# Patient Record
Sex: MALE | Race: WHITE | Employment: FULL TIME | URBAN - METROPOLITAN AREA
[De-identification: names, ages, dates, MRNs, and addresses within clinical notes are randomized per-mention and may not be internally consistent; named-entity substitution may affect disease eponyms.]

---

## 2022-12-21 ENCOUNTER — HOSPITAL ENCOUNTER (EMERGENCY)
Age: 32
Discharge: HOME | End: 2022-12-21
Payer: COMMERCIAL

## 2022-12-21 ENCOUNTER — APPOINTMENT (OUTPATIENT)
Dept: GENERAL RADIOLOGY | Age: 32
End: 2022-12-21
Payer: COMMERCIAL

## 2022-12-21 VITALS
DIASTOLIC BLOOD PRESSURE: 95 MMHG | RESPIRATION RATE: 16 BRPM | HEIGHT: 68 IN | BODY MASS INDEX: 21.98 KG/M2 | HEART RATE: 75 BPM | SYSTOLIC BLOOD PRESSURE: 142 MMHG | OXYGEN SATURATION: 100 % | TEMPERATURE: 97.8 F | WEIGHT: 145 LBS

## 2022-12-21 DIAGNOSIS — S61.313A LACERATION OF LEFT MIDDLE FINGER WITHOUT FOREIGN BODY WITH DAMAGE TO NAIL, INITIAL ENCOUNTER: Primary | ICD-10-CM

## 2022-12-21 PROCEDURE — 2500000003 HC RX 250 WO HCPCS: Performed by: NURSE PRACTITIONER

## 2022-12-21 PROCEDURE — 6370000000 HC RX 637 (ALT 250 FOR IP): Performed by: NURSE PRACTITIONER

## 2022-12-21 PROCEDURE — 73140 X-RAY EXAM OF FINGER(S): CPT

## 2022-12-21 PROCEDURE — 99283 EMERGENCY DEPT VISIT LOW MDM: CPT

## 2022-12-21 PROCEDURE — 12001 RPR S/N/AX/GEN/TRNK 2.5CM/<: CPT

## 2022-12-21 RX ORDER — GINSENG 100 MG
CAPSULE ORAL ONCE
Status: COMPLETED | OUTPATIENT
Start: 2022-12-21 | End: 2022-12-21

## 2022-12-21 RX ORDER — LIDOCAINE HYDROCHLORIDE 20 MG/ML
5 INJECTION, SOLUTION INFILTRATION; PERINEURAL ONCE
Status: COMPLETED | OUTPATIENT
Start: 2022-12-21 | End: 2022-12-21

## 2022-12-21 RX ADMIN — LIDOCAINE HYDROCHLORIDE 5 ML: 20 INJECTION, SOLUTION INFILTRATION; PERINEURAL at 17:19

## 2022-12-21 RX ADMIN — BACITRACIN: 500 OINTMENT TOPICAL at 18:01

## 2022-12-21 NOTE — ED PROVIDER NOTES
EMERGENCY DEPARTMENT ENCOUNTER        PCP: No primary care provider on file. CHIEF COMPLAINT    Chief Complaint   Patient presents with    Finger Injury     Left middle         This patient was not evaluated by the attending physician. I have independently evaluated this patient . HPI    Kirby Oviedo is a 34 y.o. male who presents urgent care with complaints of a left long finger laceration. The patient states he is a  and was working on a truck when he lacerated his finger on a piece of the engine. States he had a tetanus immunization 4 years ago. He was evaluated at urgent care who sent him here for further evaluation. He is complaining of minimal pain as he was anesthetized at 1pm while there. He does have full range of motion. Nothing alleviates or exacerbates his symptoms. He denies any other injuries at this time. He is right-hand dominant    Decreased sensation distal to area of concern, however was anesthetized at urgent care prior to arrival.    Up to date Tetanus Vaccination Status        REVIEW OF SYSTEMS    General:   Denies symptoms preceding injury. Skin: + Laceration. SEE HPI  Musculoskeletal:  No distal numbness, tingling. No obvious tendon or motor deficits. Denies any other musculoskeletal injuries or skin trauma. All other review of systems are negative  See HPI and nursing notes for additional information     [address]    History reviewed. No pertinent past medical history. History reviewed. No pertinent surgical history.     CURRENT MEDICATIONS        ALLERGIES    Not on File    SOCIAL & FAMILY HISTORY    Social History     Socioeconomic History    Marital status:      Spouse name: None    Number of children: None    Years of education: None    Highest education level: None   Tobacco Use    Smoking status: Every Day     Packs/day: 0.25     Types: Cigarettes    Smokeless tobacco: Current     Types: Chew   Substance and Sexual Activity    Alcohol use: Not Currently    Drug use: Never     History reviewed. No pertinent family history. PHYSICAL EXAM    VITAL SIGNS: BP (!) 142/95   Pulse 75   Temp 97.8 °F (36.6 °C) (Oral)   Resp 16   Ht 5' 8\" (1.727 m)   Wt 145 lb (65.8 kg)   SpO2 100%   BMI 22.05 kg/m²   Constitutional:  Well developed, Appears comfortable  HEENT:  Normocephalic, Atraumatic. PERRL, EOMI. Ear canals, nasal passages, oropharynx clear of blood or clear fluid. Musculoskeletal:  No gross deformities. No motor deficits. Distal sensation and capillary refill intact. Vascular: Distal pulses and capillary refill intact. Integument:    Left middle finger has a laceration measuring approximately 2 centimeters in length from the tip of the left middle finger beyond the DIP palmar aspect wrapping around to the medial side just under the nail. No obvious foreign body on initial inspection. No obvious tendon involvement     Distal joints with full range of motion     See below for further details. Neurologic:  Awake and alert, normal flow of speech. CN 2-12 intact. Psychiatric: Cooperative, pleasant affect        RADIOLOGY/PROCEDURES    XR FINGER LEFT (MIN 2 VIEWS)   Final Result   No acute osseous abnormality. ________________________________________________________________________       Procedure Note - Michele Gonsalez, APRN - CNP, PA-C      Laceration Repair Procedure Note    Indication: Skin Laceration -     Procedure:   - Procedure explained, including risks and benefits explained to the patient who expressed understanding. All questions were answered. Verbal consent obtained. - The Wound was prepped and draped in the usual sterile fashion using Betadine and sterile saline.  - The wound is anesthetized using 1% lidocaine, approximately 5 ml as a digital block. Topical landmarks were identified and there was no blood with aspiration.   Anesthesia was achieved    - Wound was explored to it's depth:    no foreign bodies. no compromise of neurovascular or tendon structures  - Wound was irrigated with copious amounts of sterile saline and mechanically debrided utilizing sterile gauze. - The laceration was Closed with 5-0 Ethilon sutures, total number of #6,  simple interrupted  - Hemostasis and good cosmesis was achieved. Blood loss minimal.  - The wound area was then dressed with Sterile nonstick dressing, sterile gauze, and tape. - Patient tolerated procedure well without complications. Post procedure exam of the affected region reveals distal motor, capillary refill, and pulses intact    Total repaired wound length: 2 cm  ________________________________________________________________________          ED COURSE & MEDICAL DECISION MAKING      Verbal consent, wound was repaired without difficulty. Bacitracin and bandage applied. I discussed possibility of infection, retained foreign body, tendon injury, nerve injury. Wound care instructions discussed with patient today. Instructed to follow-up with occupational health this week and call tomorrow to schedule an appointment. Suture/Staple removal in  days. (discussed today). Wound care instructions discussed. He was instructed to watch for signs of infection which were also discussed. Instructed to alternate Tylenol and ibuprofen as directed for pain. Instructed to return here with any worsening symptoms. Return to emergency Department precautions were discussed in detail with patient who understands and agrees. Vital signs and nursing notes reviewed during ED course. All pertinent Lab data and radiographic results reviewed with patient at bedside. The patient and/or the family were informed of the results of any tests/labs/imaging, the treatment plan, and time was allotted to answer questions. Clinical  IMPRESSION    1.  Laceration of left middle finger without foreign body with damage to nail, initial encounter              Comment: Please note this report has been produced using speech recognition software and may contain errors related to that system including errors in grammar, punctuation, and spelling, as well as words and phrases that may be inappropriate. If there are any questions or concerns please feel free to contact the dictating provider for clarification.       ALBINA Klein - CNP  12/21/22 5045

## 2022-12-21 NOTE — DISCHARGE INSTRUCTIONS
Keep wound clean and dry. Wash with mild soap and water daily and as needed. Apply a thin layer of antibiotic ointment twice daily. Change bandage daily and as needed. Watch for signs of infection including redness, swelling, purulent drainage, warmth, fevers, or streaking. If any of these symptoms present, follow up with your primary care provider or return here for re-evaluation. Suture/staple removal in 7 days either at your primary care provider or here. Follow up with your primary care provider, within a week, call to schedule an appointment. Return to the emergency department with worsening symptoms. Follow up with a primary care provider within a month and have them recheck your blood pressure, it was elevated during today's stay.

## 2022-12-21 NOTE — Clinical Note
Deedee Marroquin was seen and treated in our emergency department on 12/21/2022. He may return to work on 12/22/2022. If you have any questions or concerns, please don't hesitate to call.       Elvia Terrazas, ALBINA - CNP

## 2022-12-28 ENCOUNTER — HOSPITAL ENCOUNTER (EMERGENCY)
Age: 32
Discharge: HOME | End: 2022-12-28

## 2022-12-28 VITALS
RESPIRATION RATE: 16 BRPM | TEMPERATURE: 99.1 F | DIASTOLIC BLOOD PRESSURE: 96 MMHG | OXYGEN SATURATION: 99 % | HEART RATE: 96 BPM | SYSTOLIC BLOOD PRESSURE: 138 MMHG

## 2022-12-28 DIAGNOSIS — Z48.02 ENCOUNTER FOR REMOVAL OF SUTURES: Primary | ICD-10-CM

## 2022-12-28 PROCEDURE — 99282 EMERGENCY DEPT VISIT SF MDM: CPT

## 2022-12-28 ASSESSMENT — ENCOUNTER SYMPTOMS
CHEST TIGHTNESS: 0
ABDOMINAL PAIN: 0
COLOR CHANGE: 0
ROS SKIN COMMENTS: SUTURE REMOVAL
COUGH: 0
SHORTNESS OF BREATH: 0

## 2022-12-28 NOTE — Clinical Note
Roberto Essie was seen and treated in our emergency department on 12/28/2022. He may return to work on . If you have any questions or concerns, please don't hesitate to call.       Norma Merida, ALBINA - CNP

## 2022-12-28 NOTE — ED PROVIDER NOTES
[address] ENCOUNTER        Pt Name: Vanessa Alexandre  MRN: [id number]  [place] 1990  Date of evaluation: 12/28/2022  Provider: ALBINA Owens CNP  PCP: No primary care provider on file. Note Started: 9:45 AM EST    JHONATAN. I have evaluated this patient. My supervising physician was available for consultation. Triage CHIEF COMPLAINT       Chief Complaint   Patient presents with    Suture / Staple Removal         HISTORY OF PRESENT ILLNESS   (Location/Symptom, Timing/Onset, Context/Setting, Quality, Duration, Modifying Factors, Severity)  Note limiting factors. Chief Complaint: Suture removal    Vanessa Alexandre is a 34 y.o. male who presents to the emergency department for removal of aspen in the left long finger that were placed at this facility a week ago. Patient denies that he has had any issues with the laceration. Nursing Notes were all reviewed and agreed with or any disagreements were addressed in the HPI. REVIEW OF SYSTEMS    (2-9 systems for level 4, 10 or more for level 5)     Review of Systems   Constitutional:  Negative for chills, fatigue and fever. HENT:  Negative for congestion. Respiratory:  Negative for cough, chest tightness and shortness of breath. Cardiovascular:  Negative for chest pain and leg swelling. Gastrointestinal:  Negative for abdominal pain. Genitourinary:  Negative for difficulty urinating and dysuria. Musculoskeletal:  Negative for myalgias. Skin:  Negative for color change and rash. Suture removal   Neurological:  Negative for dizziness, weakness and headaches. Psychiatric/Behavioral:  Negative for confusion. PAST MEDICAL HISTORY   History reviewed. No pertinent past medical history. SURGICAL HISTORY   History reviewed. No pertinent surgical history.     CURRENTMEDICATIONS       Previous Medications    No medications on file ALLERGIES     Patient has no known allergies. FAMILYHISTORY     History reviewed. No pertinent family history. SOCIAL HISTORY       Social History     Socioeconomic History    Marital status:      Spouse name: None    Number of children: None    Years of education: None    Highest education level: None   Tobacco Use    Smoking status: Every Day     Packs/day: 0.25     Types: Cigarettes    Smokeless tobacco: Current     Types: Chew   Substance and Sexual Activity    Alcohol use: Not Currently    Drug use: Never       SCREENINGS    Jared Coma Scale  Eye Opening: Spontaneous  Best Verbal Response: Oriented  Best Motor Response: Obeys commands  [place] Coma Scale Score: 15      PHYSICAL EXAM    (up to 7 for level 4, 8 or more for level 5)     ED Triage Vitals [12/28/22 0937]   BP Temp Temp Source Heart Rate Resp SpO2 Height Weight   (!) 138/96 99.1 °F (37.3 °C) Oral (!) 106 16 99 % -- --       Physical Exam  Vitals and nursing note reviewed. Constitutional:       General: He is not in acute distress. Appearance: He is not ill-appearing or toxic-appearing. HENT:      Head: Normocephalic and atraumatic. Right Ear: External ear normal.      Left Ear: External ear normal.      Mouth/Throat:      Mouth: Mucous membranes are moist.      Pharynx: No oropharyngeal exudate or posterior oropharyngeal erythema. Eyes:      General: No scleral icterus. Cardiovascular:      Rate and Rhythm: Normal rate and regular rhythm. Pulses: Normal pulses. Heart sounds: Normal heart sounds. Pulmonary:      Effort: Pulmonary effort is normal. No respiratory distress. Breath sounds: Normal breath sounds. Abdominal:      General: There is no distension. Palpations: Abdomen is soft. Tenderness: There is no abdominal tenderness. Musculoskeletal:         General: No swelling, tenderness or deformity. Normal range of motion. Cervical back: Normal range of motion and neck supple. No rigidity or tenderness. Comments: Left middle finger has a laceration measuring approximately 2 centimeters in length from the tip of the left middle finger beyond the DIP palmar aspect wrapping around to the medial side just under the nail. Sutures intact. Motor and distal sensory intact. Skin:     General: Skin is warm and dry. Capillary Refill: Capillary refill takes less than 2 seconds. Neurological:      General: No focal deficit present. Mental Status: He is alert and oriented to person, place, and time. Psychiatric:         Mood and Affect: Mood normal.       DIAGNOSTIC RESULTS   LABS:  I have reviewed and interpreted all of the currently available lab results from this visit (if applicable) Only abnormal lab results are displayed. All other labs were within normal range or not returned as of this dictation. Labs Reviewed - No data to display    Radiographs (if obtained):  [] The following radiograph was interpreted by myself in the absence of a radiologist:   [] Radiologist's Report Reviewed:  No orders to display       Chart review shows recent radiograph(s):  XR FINGER LEFT (MIN 2 VIEWS)    Result Date: 12/21/2022  EXAMINATION: THREE XRAY VIEWS OF THE LEFT FINGERS 12/21/2022 4:17 pm COMPARISON: None. HISTORY: ORDERING SYSTEM PROVIDED HISTORY: third digit injury TECHNOLOGIST PROVIDED HISTORY: Reason for exam:->third digit injury Reason for Exam: third digit injury Additional signs and symptoms:  na Relevant Medical/Surgical History: na FINDINGS: There is no evidence of acute fracture. There is normal alignment. No acute joint abnormality. No focal osseous lesion. Wound of the distal middle finger. No acute osseous abnormality. EKG: When ordered, EKG's are interpreted by the Emergency Department Physician in the absence of a cardiologist.  Please see their note for interpretation of EKG.     PROCEDURES   Unless otherwise noted below, none     Suture Removal    Date/Time: 12/28/2022 9:54 AM  Performed by: ALBINA Caro CNP  Authorized by: ALBINA Caro CNP     Consent:     Consent obtained:  Verbal    Consent given by:  Patient  Location:     Location:  Upper extremity    Upper extremity location:  Hand    Hand location:  L index finger  Procedure details:     Wound appearance:  No signs of infection    Number of sutures removed:  6  Post-procedure details:     Post-removal:  No dressing applied    Procedure completion:  Tolerated    CRITICAL CARE TIME     Na  CONSULTS:  None    EMERGENCY DEPARTMENT COURSE and DIFFERENTIAL DIAGNOSIS/MDM:   Vitals:    Vitals:    12/28/22 0937   BP: (!) 138/96   Pulse: (!) 106   Resp: 16   Temp: 99.1 °F (37.3 °C)   TempSrc: Oral   SpO2: 99%       Is this patient to be included in the SEP-1 Core Measure due to severe sepsis or septic shock? No   Exclusion criteria - the patient is NOT to be included for SEP-1 Core Measure due to:  2+ SIRS criteria are not met     This is an alert and oriented x4, 34 y.o. yo male who presents emergency department for removal of sutures in his left long finger. Patient has easy nonlabored respirations. Vital signs notable for tachycardia. Other vital signs within acceptable parameters. Patient is afebrile and in no acute distress. He has a well appearing well-healing laceration of the distal aspect of the left long finger. No indication of infection. Sutures are intact. They were removed as per procedure note without difficulty. Tolerated well. He is advised to continue keeping the area clean and dry and use caution to the area as to not break it open. He verbalizes understanding. Patient is discharged in good condition with strict ED return guidelines. He denies any further questions or needs  FINAL IMPRESSION      1.  Encounter for removal of sutures          DISPOSITION/PLAN   DISPOSITION Decision To Discharge 12/28/2022 09:52:53 AM      PATIENT REFERREDTO:  No follow-up provider specified. DISCHARGE MEDICATIONS:  New Prescriptions    No medications on file       DISCONTINUED MEDICATIONS:  Discontinued Medications    No medications on file            Comment: Please note this report has been produced using speech recognition software and may contain errors related to that system including errors in grammar, punctuation, and spelling, as well as words and phrases that may be inappropriate. If there are any questions or concerns please feel free to contact the dictating provider for clarification.   ALBINA Womack CNP (electronically signed)       ALBINA Womack CNP  12/28/22 1107

## 2022-12-28 NOTE — Clinical Note
Cheryl Hansen was seen and treated in our emergency department on 12/28/2022. He may return to work on 12/28/2022. If you have any questions or concerns, please don't hesitate to call.       Lacho Byrd, ALBINA - CNP